# Patient Record
Sex: FEMALE | Race: WHITE | NOT HISPANIC OR LATINO | URBAN - METROPOLITAN AREA
[De-identification: names, ages, dates, MRNs, and addresses within clinical notes are randomized per-mention and may not be internally consistent; named-entity substitution may affect disease eponyms.]

---

## 2018-03-27 NOTE — PROGRESS NOTES
Subjective:      Patient ID: Zoila Fuchs is a 62 y.o. female.    Chief Complaint: Ovarian Cancer (Referred by Dr. Walters)      HPI     Ms. Fuchs is a 62yr old para 1 referred by Dr. Walters for recurrence/progression of IIIC ovarian cancer despite debulking and adjuvant therapy.    10/2016 abdominal/GI symptoms, CT with pelvic mass and  3559. DAVID/BSO, omentectomy, appendectomy, sigmoid resection with colostomy, debulking, biopsies, splenic laceration. 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  26.6. 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 Late Alzheimer's so mass on ovary not assessed (70), F - colon CA (67)    LMP 53, no PMB or HRT.    Pt reports continued early satiety, pain to L shoulder when she eats, generalized abdominal pelvic discomfort, greater to RLQ with hernia, dry cough unrelieved with allergy medicine, and sleep disturbance from cough.    Review of Systems   Constitutional: Positive for activity change, appetite change (early satiety) and fatigue. Negative for chills, diaphoresis and fever.   Respiratory: Positive for cough, chest tightness, shortness of breath and wheezing.    Cardiovascular: Negative  for chest pain, palpitations and leg swelling.   Gastrointestinal: Positive for abdominal distention and abdominal pain. Negative for blood in stool, constipation, diarrhea, nausea and vomiting.   Genitourinary: Positive for pelvic pain (RLQ hernia). Negative for difficulty urinating, dysuria, flank pain, frequency, hematuria, urgency, vaginal bleeding, vaginal discharge and vaginal pain.   Musculoskeletal: Negative for back pain.   Skin: Negative for color change.   Allergic/Immunologic: Positive for immunocompromised state.   Neurological: Negative for dizziness, light-headedness and headaches.   Psychiatric/Behavioral: Negative for agitation. The patient is not nervous/anxious.        Past Medical History:   Diagnosis Date    Allergy     Clotting disorder     Hypertension     Ovarian cancer      Past Surgical History:   Procedure Laterality Date    APPENDECTOMY      COLON SURGERY  05/2017    colostomy reversal    HYSTERECTOMY  10/2016    DAVID/BSO, omentectomy, appendectomy, sigmoid resection w colostomy, splenic laceration    OMENTECTOMY      OK REMOVAL OF OVARY/TUBE(S)       Family History   Problem Relation Age of Onset    Hypertension Mother     Ovarian cancer Mother 70     ? late Alzheimers and mass on ovary not assessed    Colon cancer Father 67     Social History     Social History    Marital status:      Spouse name: N/A    Number of children: N/A    Years of education: N/A     Occupational History    Not on file.     Social History Main Topics    Smoking status: Never Smoker    Smokeless tobacco: Never Used    Alcohol use No    Drug use: No    Sexual activity: Not on file     Other Topics Concern    Not on file     Social History Narrative    No narrative on file     Current Outpatient Prescriptions   Medication Sig    ALPRAZolam (XANAX) 0.25 MG tablet Take 0.25 mg by mouth 3 (three) times daily.    calcium-vitamin D3 500 mg(1,250mg) -200 unit per tablet Take 1 tablet by  mouth.    docusate sodium (COLACE) 100 MG capsule Take 100 mg by mouth.    fluticasone (FLONASE) 50 mcg/actuation nasal spray 2 sprays by Nasal route.    pantoprazole (PROTONIX) 40 MG tablet Take 40 mg by mouth.    rivaroxaban (XARELTO) 20 mg Tab Take 20 mg by mouth.     No current facility-administered medications for this visit.      Review of patient's allergies indicates:  Allergies not on file    Objective:   Physical Exam:   Constitutional: She is oriented to person, place, and time. She appears well-developed and well-nourished. No distress.    HENT:   Head: Normocephalic.     Neck: Normal range of motion.    Cardiovascular: Exam reveals no cyanosis and no edema.     Pulmonary/Chest: Effort normal. No respiratory distress.        Abdominal: Soft. She exhibits abdominal incision. There is no guarding.         Genitourinary: Pelvic exam was performed with patient supine. Right adnexum displays no mass, no tenderness and no fullness. Left adnexum displays no mass, no tenderness and no fullness. Vaginal cuff normal.          Musculoskeletal: Moves all extremeties. She exhibits no edema.      Lymphadenopathy:        Right: No inguinal adenopathy present.        Left: No inguinal adenopathy present.    Neurological: She is alert and oriented to person, place, and time.    Skin: Skin is warm and dry. No cyanosis. No pallor.    Psychiatric: She has a normal mood and affect.       Assessment:     1. Malignant neoplasm of ovary, unspecified laterality        Plan:       Comprehensive discussion with the patient and her daughter and .  Complete record review performed.  I would recommend Gemzar monotherapy a she is platinum resistant.  In addition, she may benefit from a cough standpoint from a thoracentesis, which she agreed to.  It appears she has mostly payal disease, so I prepared her for what will probably be continual type treatment for the remainder of her disease course.  All questions answered and  they voiced agreement with the plan.  Will call Dr. Walters's office to arrange.

## 2018-03-28 ENCOUNTER — INITIAL CONSULT (OUTPATIENT)
Dept: GYNECOLOGIC ONCOLOGY | Facility: CLINIC | Age: 63
End: 2018-03-28
Payer: COMMERCIAL

## 2018-03-28 VITALS — HEART RATE: 114 BPM | DIASTOLIC BLOOD PRESSURE: 89 MMHG | WEIGHT: 132 LBS | SYSTOLIC BLOOD PRESSURE: 175 MMHG

## 2018-03-28 DIAGNOSIS — C56.9 MALIGNANT NEOPLASM OF OVARY, UNSPECIFIED LATERALITY: ICD-10-CM

## 2018-03-28 DIAGNOSIS — Z51.11 ENCOUNTER FOR ANTINEOPLASTIC CHEMOTHERAPY: Primary | ICD-10-CM

## 2018-03-28 PROCEDURE — 99999 PR PBB SHADOW E&M-EST. PATIENT-LVL II: CPT | Mod: PBBFAC,,, | Performed by: OBSTETRICS & GYNECOLOGY

## 2018-03-28 PROCEDURE — 99245 OFF/OP CONSLTJ NEW/EST HI 55: CPT | Mod: S$GLB,,, | Performed by: OBSTETRICS & GYNECOLOGY

## 2018-03-28 RX ORDER — FERROUS SULFATE, DRIED 160(50) MG
1 TABLET, EXTENDED RELEASE ORAL
COMMUNITY

## 2018-03-28 RX ORDER — PANTOPRAZOLE SODIUM 40 MG/1
40 TABLET, DELAYED RELEASE ORAL
COMMUNITY
Start: 2018-01-23

## 2018-03-28 RX ORDER — FLUTICASONE PROPIONATE 50 MCG
2 SPRAY, SUSPENSION (ML) NASAL
COMMUNITY
Start: 2018-03-05

## 2018-03-28 RX ORDER — ALPRAZOLAM 0.25 MG/1
0.25 TABLET ORAL 3 TIMES DAILY
COMMUNITY

## 2018-03-28 RX ORDER — DOCUSATE SODIUM 100 MG/1
100 CAPSULE, LIQUID FILLED ORAL
COMMUNITY

## 2018-03-28 NOTE — LETTER
March 28, 2018      MAURICE Walters III, MD  [address] [facility] [address]  [facility] [address]           [facility] - GYN Oncology  [address]  [place] [address]  Phone: [phone]          Patient: Zoila Fuchs   MR Number: [id number]   YOB: 1955   Date of Visit: 3/28/2018       Dear Dr. MAURICE Walters III:    Thank you for referring Zoila Fuchs to me for evaluation. Attached you will find relevant portions of my assessment and plan of care.    If you have questions, please do not hesitate to call me. I look forward to following Zoila Fuchs along with you.    Sincerely,    Sami Jin MD    Enclosure  CC:  No Recipients    If you would like to receive this communication electronically, please contact [email or web address] or [phone] to request more information on [employer] access.    For providers and/or their staff who would like to refer a patient to [facility], please contact us through our one-stop-shop provider referral line, [facility], at [phone].    If you feel you have received this communication in error or would no longer like to receive these types of communications, please e-mail [email or web address]